# Patient Record
Sex: FEMALE | Race: BLACK OR AFRICAN AMERICAN | NOT HISPANIC OR LATINO
[De-identification: names, ages, dates, MRNs, and addresses within clinical notes are randomized per-mention and may not be internally consistent; named-entity substitution may affect disease eponyms.]

---

## 2023-09-01 DIAGNOSIS — Z86.39 PERSONAL HISTORY OF OTHER ENDOCRINE, NUTRITIONAL AND METABOLIC DISEASE: ICD-10-CM

## 2023-09-01 DIAGNOSIS — Z86.79 PERSONAL HISTORY OF OTHER DISEASES OF THE CIRCULATORY SYSTEM: ICD-10-CM

## 2023-09-01 PROBLEM — Z00.00 ENCOUNTER FOR PREVENTIVE HEALTH EXAMINATION: Status: ACTIVE | Noted: 2023-09-01

## 2023-09-01 RX ORDER — ROSUVASTATIN CALCIUM 40 MG/1
40 TABLET, FILM COATED ORAL
Refills: 0 | Status: ACTIVE | COMMUNITY

## 2023-09-01 RX ORDER — ENALAPRIL MALEATE 10 MG/1
10 TABLET ORAL
Refills: 0 | Status: ACTIVE | COMMUNITY

## 2023-09-01 RX ORDER — AMLODIPINE BESYLATE 10 MG/1
10 TABLET ORAL
Refills: 0 | Status: ACTIVE | COMMUNITY

## 2023-09-01 RX ORDER — SEMAGLUTIDE 0.68 MG/ML
2 INJECTION, SOLUTION SUBCUTANEOUS
Refills: 0 | Status: ACTIVE | COMMUNITY

## 2023-09-01 RX ORDER — ASPIRIN 81 MG/1
81 TABLET ORAL
Refills: 0 | Status: ACTIVE | COMMUNITY

## 2023-09-01 RX ORDER — ALBUTEROL SULFATE 90 UG/1
108 (90 BASE) INHALANT RESPIRATORY (INHALATION)
Refills: 0 | Status: ACTIVE | COMMUNITY

## 2023-09-01 RX ORDER — SEMAGLUTIDE 1.34 MG/ML
2 INJECTION, SOLUTION SUBCUTANEOUS
Refills: 0 | Status: ACTIVE | COMMUNITY

## 2023-09-07 ENCOUNTER — APPOINTMENT (OUTPATIENT)
Dept: INFECTIOUS DISEASE | Facility: CLINIC | Age: 63
End: 2023-09-07
Payer: MEDICARE

## 2023-09-07 ENCOUNTER — NON-APPOINTMENT (OUTPATIENT)
Age: 63
End: 2023-09-07

## 2023-09-07 VITALS
HEIGHT: 64 IN | OXYGEN SATURATION: 96 % | HEART RATE: 69 BPM | DIASTOLIC BLOOD PRESSURE: 60 MMHG | BODY MASS INDEX: 35.85 KG/M2 | WEIGHT: 210 LBS | TEMPERATURE: 98.4 F | SYSTOLIC BLOOD PRESSURE: 102 MMHG

## 2023-09-07 DIAGNOSIS — M86.9 OSTEOMYELITIS, UNSPECIFIED: ICD-10-CM

## 2023-09-07 PROCEDURE — 99215 OFFICE O/P EST HI 40 MIN: CPT

## 2023-09-07 NOTE — REVIEW OF SYSTEMS
[Fever] : no fever [Chills] : no chills [Body Aches] : no body aches [Difficulty Sleeping] : no difficulty sleeping [Feeling Tired] : not feeling tired [Feeling Sick] : not feeling sick [Normal Appetite] : appetite not normal  [Eye Pain] : no eye pain [Red Eyes] : eyes not red [Eyesight Problems] : no eyesight problems [Discharge From Eyes] : no purulent discharge from the eyes [Earache] : no earache [Loss Of Hearing] : no hearing loss [Nasal Discharge] : no nasal discharge [Sore Throat] : no sore throat [Hoarseness] : no hoarseness [Chest Pain] : no chest pain [Palpitations] : no palpitations [Leg Claudication] : no intermittent leg claudication [Lower Ext Edema] : no lower extremity edema [Shortness Of Breath] : no shortness of breath [Wheezing] : no wheezing [Cough] : no cough [SOB on Exertion] : no shortness of breath during exertion [Sputum] : not coughing up ~M sputum [Pleuritic Chest Pain] : no pleuritic chest pain [Abdominal Pain] : no abdominal pain [Vomiting] : no vomiting [Constipation] : no constipation [Diarrhea] : no diarrhea [Dysuria] : no dysuria [Negative] : Heme/Lymph [FreeTextEntry9] : distal left leg pain

## 2023-09-07 NOTE — PHYSICAL EXAM
[General Appearance - Alert] : alert [General Appearance - In No Acute Distress] : in no acute distress [General Appearance - Well Nourished] : well nourished [General Appearance - Well-Appearing] : healthy appearing [Sclera] : the sclera and conjunctiva were normal [PERRL With Normal Accommodation] : pupils were equal in size, round, reactive to light [Extraocular Movements] : extraocular movements were intact [Outer Ear] : the ears and nose were normal in appearance [Hearing Threshold Finger Rub Not [place]] : hearing was normal [Examination Of The Oral Cavity] : the lips and gums were normal [Oropharynx] : the oropharynx was normal with no thrush [Neck Appearance] : the appearance of the neck was normal [Neck Cervical Mass (___cm)] : no neck mass was observed [Jugular Venous Distention Increased] : there was no jugular-venous distention [Thyroid Diffuse Enlargement] : the thyroid was not enlarged [Respiration, Rhythm And Depth] : normal respiratory rhythm and effort [Exaggerated Use Of Accessory Muscles For Inspiration] : no accessory muscle use [Auscultation Breath Sounds / Voice Sounds] : lungs were clear to auscultation bilaterally [Heart Rate And Rhythm] : heart rate was normal and rhythm regular [Heart Sounds] : normal S1 and S2 [Heart Sounds Gallop] : no gallops [Murmurs] : no murmurs [Heart Sounds Pericardial Friction Rub] : no pericardial rub [Full Pulse] : the pedal pulses are present [Edema] : there was no peripheral edema [Bowel Sounds] : normal bowel sounds [Abdomen Soft] : soft [Abdomen Tenderness] : non-tender [Abdomen Mass (___ Cm)] : no abdominal mass palpated [Costovertebral Angle Tenderness] : no CVA tenderness [No Palpable Adenopathy] : no palpable adenopathy [Cervical Lymph Nodes Enlarged Posterior Bilaterally] : posterior cervical [Cervical Lymph Nodes Enlarged Anterior Bilaterally] : anterior cervical [Femoral Lymph Nodes Enlarged Bilaterally] : femoral [Inguinal Lymph Nodes Enlarged Bilaterally] : inguinal [FreeTextEntry1] : tender swollen left ankle warm no erythema no open wound no lymphangitis [Skin Color & Pigmentation] : normal skin color and pigmentation [] : no rash [Skin Lesions] : no skin lesions [Cranial Nerves] : cranial nerves 2-12 were intact [Sensation] : the sensory exam was normal to light touch and pinprick [Motor Exam] : the motor exam was normal [No Focal Deficits] : no focal deficits [Oriented To Time, Place, And Person] : oriented to person, place, and time [Affect] : the affect was normal

## 2023-09-07 NOTE — ASSESSMENT
[FreeTextEntry1] : 63 Female with DM, HTN, C/O distal left leg pain ? distal tibial osteomyelitis on CT Pt had appt with [facility] on 9/8 , therefore AB therapy was held pending ortho eval and ? Bone Biopsy Later in day, I found out that Pt's ortho appt was cancelled for insurance reasons.  PLAN: LABS Osteo panel.  BC            Phone F/U 9/8  IV or PO AB [Treatment Education] : treatment education [Medical Care Issues] : medical care issues

## 2023-09-07 NOTE — HISTORY OF PRESENT ILLNESS
[FreeTextEntry1] : 63 Female with HN, DM, C/O tender disal left leg MRI Left calf 8/22/23  S/P Left TKA, ? distal tibia osteo,  bone marrow edema in medullary cavity of distal tibia Pt is not on AB Had appt with [facility] 9/8

## 2023-09-07 NOTE — REASON FOR VISIT
[Initial Evaluation] : an initial evaluation [FreeTextEntry1] : Patient S/P L TKA. As per patient was complaining of pain, went to PCP Dr. Aaron Rojas and did xray and mri. Showed fluid in surrounding tissue, possible Osteomyelitis. Patient states approximately 1 month ago pcp put patient on oral steroids and oral abx, but issue did not resolve. Patient states sometimes L knee painful to touch.

## 2023-09-08 LAB
ALBUMIN SERPL ELPH-MCNC: 4.6 G/DL
ALP BLD-CCNC: 100 U/L
ALT SERPL-CCNC: 25 U/L
ANION GAP SERPL CALC-SCNC: 11 MMOL/L
AST SERPL-CCNC: 21 U/L
BASOPHILS # BLD AUTO: 0.05 K/UL
BASOPHILS NFR BLD AUTO: 0.6 %
BILIRUB SERPL-MCNC: 0.3 MG/DL
BUN SERPL-MCNC: 12 MG/DL
CALCIUM SERPL-MCNC: 10.2 MG/DL
CHLORIDE SERPL-SCNC: 105 MMOL/L
CO2 SERPL-SCNC: 28 MMOL/L
CREAT SERPL-MCNC: 0.69 MG/DL
CRP SERPL-MCNC: <3 MG/L
EGFR: 97 ML/MIN/1.73M2
EOSINOPHIL # BLD AUTO: 0.09 K/UL
EOSINOPHIL NFR BLD AUTO: 1 %
ERYTHROCYTE [SEDIMENTATION RATE] IN BLOOD BY WESTERGREN METHOD: 31 MM/HR
ESTIMATED AVERAGE GLUCOSE: 154 MG/DL
GLUCOSE SERPL-MCNC: 105 MG/DL
HBA1C MFR BLD HPLC: 7 %
HCT VFR BLD CALC: 42.9 %
HGB BLD-MCNC: 13.1 G/DL
IMM GRANULOCYTES NFR BLD AUTO: 0.3 %
LYMPHOCYTES # BLD AUTO: 1.62 K/UL
LYMPHOCYTES NFR BLD AUTO: 18.1 %
MAN DIFF?: NORMAL
MCHC RBC-ENTMCNC: 28.9 PG
MCHC RBC-ENTMCNC: 30.5 GM/DL
MCV RBC AUTO: 94.7 FL
MONOCYTES # BLD AUTO: 0.87 K/UL
MONOCYTES NFR BLD AUTO: 9.7 %
NEUTROPHILS # BLD AUTO: 6.27 K/UL
NEUTROPHILS NFR BLD AUTO: 70.3 %
PLATELET # BLD AUTO: 327 K/UL
POTASSIUM SERPL-SCNC: 4.8 MMOL/L
PROT SERPL-MCNC: 7.3 G/DL
RBC # BLD: 4.53 M/UL
RBC # FLD: 14.6 %
SODIUM SERPL-SCNC: 143 MMOL/L
WBC # FLD AUTO: 8.93 K/UL

## 2023-09-13 LAB — BACTERIA BLD CULT: NORMAL

## 2023-09-14 ENCOUNTER — APPOINTMENT (OUTPATIENT)
Dept: INFECTIOUS DISEASE | Facility: CLINIC | Age: 63
End: 2023-09-14
Payer: MEDICARE

## 2023-09-14 VITALS
HEART RATE: 69 BPM | TEMPERATURE: 98.4 F | SYSTOLIC BLOOD PRESSURE: 108 MMHG | DIASTOLIC BLOOD PRESSURE: 68 MMHG | OXYGEN SATURATION: 98 %

## 2023-09-14 PROCEDURE — 99214 OFFICE O/P EST MOD 30 MIN: CPT

## 2023-09-28 ENCOUNTER — APPOINTMENT (OUTPATIENT)
Dept: INFECTIOUS DISEASE | Facility: CLINIC | Age: 63
End: 2023-09-28
Payer: MEDICARE

## 2023-09-28 VITALS
DIASTOLIC BLOOD PRESSURE: 62 MMHG | TEMPERATURE: 98.3 F | SYSTOLIC BLOOD PRESSURE: 112 MMHG | HEART RATE: 72 BPM | OXYGEN SATURATION: 100 %

## 2023-09-28 PROCEDURE — 99214 OFFICE O/P EST MOD 30 MIN: CPT

## 2023-09-29 LAB
ALBUMIN SERPL ELPH-MCNC: 4.7 G/DL
ALP BLD-CCNC: 86 U/L
ALT SERPL-CCNC: 23 U/L
ANION GAP SERPL CALC-SCNC: 10 MMOL/L
AST SERPL-CCNC: 22 U/L
BASOPHILS # BLD AUTO: 0.07 K/UL
BASOPHILS NFR BLD AUTO: 0.9 %
BILIRUB SERPL-MCNC: 0.2 MG/DL
BUN SERPL-MCNC: 10 MG/DL
CALCIUM SERPL-MCNC: 9.7 MG/DL
CHLORIDE SERPL-SCNC: 105 MMOL/L
CO2 SERPL-SCNC: 29 MMOL/L
CREAT SERPL-MCNC: 0.69 MG/DL
CRP SERPL-MCNC: <3 MG/L
EGFR: 97 ML/MIN/1.73M2
EOSINOPHIL # BLD AUTO: 0.16 K/UL
EOSINOPHIL NFR BLD AUTO: 2 %
ERYTHROCYTE [SEDIMENTATION RATE] IN BLOOD BY WESTERGREN METHOD: 8 MM/HR
GLUCOSE SERPL-MCNC: 97 MG/DL
HCT VFR BLD CALC: 42.5 %
HGB BLD-MCNC: 13.1 G/DL
IMM GRANULOCYTES NFR BLD AUTO: 0.2 %
LYMPHOCYTES # BLD AUTO: 1.33 K/UL
LYMPHOCYTES NFR BLD AUTO: 16.5 %
MAN DIFF?: NORMAL
MCHC RBC-ENTMCNC: 28.4 PG
MCHC RBC-ENTMCNC: 30.8 GM/DL
MCV RBC AUTO: 92 FL
MONOCYTES # BLD AUTO: 0.64 K/UL
MONOCYTES NFR BLD AUTO: 7.9 %
NEUTROPHILS # BLD AUTO: 5.84 K/UL
NEUTROPHILS NFR BLD AUTO: 72.5 %
PLATELET # BLD AUTO: 297 K/UL
POTASSIUM SERPL-SCNC: 4.3 MMOL/L
PROT SERPL-MCNC: 7.4 G/DL
RBC # BLD: 4.62 M/UL
RBC # FLD: 14 %
SODIUM SERPL-SCNC: 144 MMOL/L
WBC # FLD AUTO: 8.06 K/UL

## 2023-10-26 ENCOUNTER — NON-APPOINTMENT (OUTPATIENT)
Age: 63
End: 2023-10-26

## 2023-10-26 ENCOUNTER — APPOINTMENT (OUTPATIENT)
Dept: INFECTIOUS DISEASE | Facility: CLINIC | Age: 63
End: 2023-10-26
Payer: MEDICARE

## 2023-10-26 VITALS
DIASTOLIC BLOOD PRESSURE: 80 MMHG | SYSTOLIC BLOOD PRESSURE: 116 MMHG | HEART RATE: 62 BPM | OXYGEN SATURATION: 99 % | TEMPERATURE: 98.1 F

## 2023-10-26 DIAGNOSIS — M86.9 OSTEOMYELITIS, UNSPECIFIED: ICD-10-CM

## 2023-10-26 DIAGNOSIS — Z23 ENCOUNTER FOR IMMUNIZATION: ICD-10-CM

## 2023-10-26 PROCEDURE — 99214 OFFICE O/P EST MOD 30 MIN: CPT

## 2023-10-26 RX ORDER — CIPROFLOXACIN HYDROCHLORIDE 500 MG/1
500 TABLET, FILM COATED ORAL
Refills: 0 | Status: DISCONTINUED | COMMUNITY
End: 2023-10-26

## 2023-10-26 RX ORDER — OXYCODONE HYDROCHLORIDE AND ACETAMINOPHEN 5; 325 MG/1; MG/1
5-325 TABLET ORAL
Refills: 0 | Status: DISCONTINUED | COMMUNITY
End: 2023-10-26

## 2023-10-26 RX ORDER — SILVER SULFADIAZINE 10 MG/G
1 CREAM TOPICAL
Refills: 0 | Status: DISCONTINUED | COMMUNITY
End: 2023-10-26

## 2023-10-26 RX ORDER — DALBAVANCIN 500 MG/25ML
INJECTION, POWDER, FOR SOLUTION INTRAVENOUS
Refills: 0 | Status: DISCONTINUED | COMMUNITY
End: 2023-10-26

## 2023-10-27 LAB
ALBUMIN SERPL ELPH-MCNC: 4.4 G/DL
ALP BLD-CCNC: 83 U/L
ALT SERPL-CCNC: 17 U/L
ANION GAP SERPL CALC-SCNC: 12 MMOL/L
AST SERPL-CCNC: 20 U/L
BASOPHILS # BLD AUTO: 0.05 K/UL
BASOPHILS NFR BLD AUTO: 0.7 %
BILIRUB SERPL-MCNC: 0.2 MG/DL
BUN SERPL-MCNC: 12 MG/DL
CALCIUM SERPL-MCNC: 9.8 MG/DL
CHLORIDE SERPL-SCNC: 106 MMOL/L
CO2 SERPL-SCNC: 26 MMOL/L
CREAT SERPL-MCNC: 0.63 MG/DL
CRP SERPL-MCNC: <3 MG/L
EGFR: 100 ML/MIN/1.73M2
EOSINOPHIL # BLD AUTO: 0.1 K/UL
EOSINOPHIL NFR BLD AUTO: 1.3 %
ERYTHROCYTE [SEDIMENTATION RATE] IN BLOOD BY WESTERGREN METHOD: 26 MM/HR
GLUCOSE SERPL-MCNC: 99 MG/DL
HCT VFR BLD CALC: 40.3 %
HGB BLD-MCNC: 12.6 G/DL
IMM GRANULOCYTES NFR BLD AUTO: 0.3 %
LYMPHOCYTES # BLD AUTO: 1.61 K/UL
LYMPHOCYTES NFR BLD AUTO: 21.2 %
MAN DIFF?: NORMAL
MCHC RBC-ENTMCNC: 28.8 PG
MCHC RBC-ENTMCNC: 31.3 GM/DL
MCV RBC AUTO: 92.2 FL
MONOCYTES # BLD AUTO: 0.6 K/UL
MONOCYTES NFR BLD AUTO: 7.9 %
NEUTROPHILS # BLD AUTO: 5.23 K/UL
NEUTROPHILS NFR BLD AUTO: 68.6 %
PLATELET # BLD AUTO: 288 K/UL
POTASSIUM SERPL-SCNC: 4.4 MMOL/L
PROT SERPL-MCNC: 7.1 G/DL
RBC # BLD: 4.37 M/UL
RBC # FLD: 14 %
SODIUM SERPL-SCNC: 143 MMOL/L
WBC # FLD AUTO: 7.61 K/UL

## 2023-11-01 ENCOUNTER — NON-APPOINTMENT (OUTPATIENT)
Age: 63
End: 2023-11-01

## 2024-12-16 ENCOUNTER — APPOINTMENT (OUTPATIENT)
Dept: ORTHOPEDIC SURGERY | Facility: CLINIC | Age: 64
End: 2024-12-16
Payer: MEDICARE

## 2024-12-16 VITALS — HEIGHT: 65 IN | BODY MASS INDEX: 30.99 KG/M2 | WEIGHT: 186 LBS

## 2024-12-16 DIAGNOSIS — M25.511 PAIN IN RIGHT SHOULDER: ICD-10-CM

## 2024-12-16 DIAGNOSIS — Z87.891 PERSONAL HISTORY OF NICOTINE DEPENDENCE: ICD-10-CM

## 2024-12-16 DIAGNOSIS — M75.81 OTHER SHOULDER LESIONS, RIGHT SHOULDER: ICD-10-CM

## 2024-12-16 PROCEDURE — 73030 X-RAY EXAM OF SHOULDER: CPT | Mod: RT

## 2024-12-16 PROCEDURE — 20610 DRAIN/INJ JOINT/BURSA W/O US: CPT | Mod: RT

## 2024-12-16 PROCEDURE — 99204 OFFICE O/P NEW MOD 45 MIN: CPT | Mod: 25

## 2024-12-19 ENCOUNTER — OFFICE (OUTPATIENT)
Dept: URBAN - METROPOLITAN AREA CLINIC 104 | Facility: CLINIC | Age: 64
Setting detail: OPHTHALMOLOGY
End: 2024-12-19
Payer: COMMERCIAL

## 2024-12-19 DIAGNOSIS — H40.013: ICD-10-CM

## 2024-12-19 DIAGNOSIS — H25.13: ICD-10-CM

## 2024-12-19 PROCEDURE — IME IME: Performed by: SPECIALIST

## 2024-12-19 ASSESSMENT — REFRACTION_CURRENTRX
OS_ADD: +2.75
OS_AXIS: 95
OS_SPHERE: +2.50
OD_OVR_VA: 20/
OD_SPHERE: +3.00
OS_OVR_VA: 20/
OD_ADD: +2.75
OD_AXIS: 119
OD_CYLINDER: -1.50
OS_CYLINDER: -0.75

## 2024-12-19 ASSESSMENT — REFRACTION_AUTOREFRACTION
OD_AXIS: 95
OS_SPHERE: +2.75
OD_CYLINDER: -1.50
OS_AXIS: 76
OD_SPHERE: +3.50
OS_CYLINDER: -0.75

## 2024-12-19 ASSESSMENT — REFRACTION_MANIFEST
OD_SPHERE: +3.50
OD_AXIS: 95
OS_SPHERE: +2.75
OD_VA1: 20/25
OD_ADD: +2.50
OD_CYLINDER: -1.50
OS_ADD: +2.50
OS_VA1: 20/25
OS_AXIS: 75
OS_CYLINDER: -0.75

## 2024-12-19 ASSESSMENT — TONOMETRY
OS_IOP_MMHG: 15
OD_IOP_MMHG: 15

## 2024-12-19 ASSESSMENT — VISUAL ACUITY
OS_BCVA: 20/30
OD_BCVA: 20/25

## 2024-12-19 ASSESSMENT — CONFRONTATIONAL VISUAL FIELD TEST (CVF)
OD_FINDINGS: FULL
OS_FINDINGS: FULL

## 2024-12-19 ASSESSMENT — PACHYMETRY
OD_CT_UM: 530
OS_CT_CORRECTION: 1
OS_CT_UM: 534
OD_CT_CORRECTION: 1

## 2025-01-15 ENCOUNTER — APPOINTMENT (OUTPATIENT)
Dept: ORTHOPEDIC SURGERY | Facility: CLINIC | Age: 65
End: 2025-01-15
Payer: MEDICARE

## 2025-01-15 DIAGNOSIS — M75.81 OTHER SHOULDER LESIONS, RIGHT SHOULDER: ICD-10-CM

## 2025-01-15 DIAGNOSIS — M25.511 PAIN IN RIGHT SHOULDER: ICD-10-CM

## 2025-01-15 PROCEDURE — 99213 OFFICE O/P EST LOW 20 MIN: CPT

## 2025-01-16 ENCOUNTER — APPOINTMENT (OUTPATIENT)
Dept: ORTHOPEDIC SURGERY | Facility: CLINIC | Age: 65
End: 2025-01-16

## 2025-01-31 ENCOUNTER — APPOINTMENT (OUTPATIENT)
Dept: ORTHOPEDIC SURGERY | Facility: CLINIC | Age: 65
End: 2025-01-31
Payer: MEDICARE

## 2025-01-31 DIAGNOSIS — M25.511 PAIN IN RIGHT SHOULDER: ICD-10-CM

## 2025-01-31 DIAGNOSIS — M75.111 INCOMPLETE ROTATOR CUFF TEAR OR RUPTURE OF RIGHT SHOULDER, NOT SPECIFIED AS TRAUMATIC: ICD-10-CM

## 2025-01-31 DIAGNOSIS — M54.12 RADICULOPATHY, CERVICAL REGION: ICD-10-CM

## 2025-01-31 PROCEDURE — 99214 OFFICE O/P EST MOD 30 MIN: CPT

## 2025-01-31 RX ORDER — MELOXICAM 15 MG/1
15 TABLET ORAL DAILY
Qty: 30 | Refills: 2 | Status: ACTIVE | COMMUNITY
Start: 2025-01-31 | End: 1900-01-01

## 2025-01-31 RX ORDER — CYCLOBENZAPRINE HYDROCHLORIDE 5 MG/1
5 TABLET, FILM COATED ORAL
Qty: 30 | Refills: 2 | Status: ACTIVE | COMMUNITY
Start: 2025-01-31 | End: 1900-01-01

## 2025-03-28 ENCOUNTER — APPOINTMENT (OUTPATIENT)
Dept: ORTHOPEDIC SURGERY | Facility: CLINIC | Age: 65
End: 2025-03-28

## 2025-04-11 ENCOUNTER — APPOINTMENT (OUTPATIENT)
Dept: ORTHOPEDIC SURGERY | Facility: CLINIC | Age: 65
End: 2025-04-11
Payer: MEDICARE

## 2025-04-11 DIAGNOSIS — M75.111 INCOMPLETE ROTATOR CUFF TEAR OR RUPTURE OF RIGHT SHOULDER, NOT SPECIFIED AS TRAUMATIC: ICD-10-CM

## 2025-04-11 DIAGNOSIS — M25.511 PAIN IN RIGHT SHOULDER: ICD-10-CM

## 2025-04-11 PROCEDURE — 20610 DRAIN/INJ JOINT/BURSA W/O US: CPT | Mod: RT

## 2025-04-11 PROCEDURE — 99214 OFFICE O/P EST MOD 30 MIN: CPT | Mod: 25

## 2025-06-06 ENCOUNTER — APPOINTMENT (OUTPATIENT)
Dept: ORTHOPEDIC SURGERY | Facility: CLINIC | Age: 65
End: 2025-06-06